# Patient Record
Sex: MALE | Race: WHITE | NOT HISPANIC OR LATINO | Employment: OTHER | URBAN - METROPOLITAN AREA
[De-identification: names, ages, dates, MRNs, and addresses within clinical notes are randomized per-mention and may not be internally consistent; named-entity substitution may affect disease eponyms.]

---

## 2024-04-30 NOTE — PROGRESS NOTES
Chris Dickinson MD  [facility]  [address]  [place] [address]      Dear Doctor Alok,    Thank you for asking me to see your patient, Mr. Uli Macias, in consultation to evaluate his facial trauma.  Today I had the pleasure of seeing him at the Facial Plastic and Reconstructive Surgery Clinic in the Department of Otolaryngology at [facility].    IMPRESSION & RECOMMENDATIONS  1.) Bilateral midfacial fractures including nasal bone, septal, and LeFort II, and left orbital floor  Trauma date: 4/24/24. Mechanism: fall from standing.  Medical Decision Making (MDM): (acute complicated injury) he reports no symptoms of visual changes, nasal obstruction, significant change in appearance, or movement of the fractures. His palate is stable and there is no evidence of entrapment. On CT scan the fractures look minimally displaced. He does not want any operative management if he can avoid it. Will manage non-operatively but will avoid pressure on the fractures with dentures. See checklist below.   Care Checklist:  _No dentures until next visit.  _NO CHEW diet.  _No nose blowing, sneeze with open mouth.  _Ophthalmology consultation    2.) Frontal sinus duct fracture  Trauma date: 4/24/24. Mechanism: fall from standing.  Medical Decision Making (MDM): (acute complicated injury) Again these fractures are relatively non-displaced but we will need to monitor for mucocele formation.   Care Checklist:  _CT scan in 6 months for baseline, then rescan in 3 years to monitor for mucocele formation.    3.) Tobacco use  Medical Decision Making (MDM): (stable chronic problem or illness). The patient currently smokes.  Care Checklist:  _Smoking cessation.     4.) Obstructive Sleep Apnea.  Medical Decision Making (MDM): (stable chronic problem or illness). Diagnosed several years ago. Wears CPAP mask goes over nose and mouth. Usually wears all night. Has not  "used CPAP since his trauma. He reports sleeping well after the trauma.   Care Checklist:  _Observe postoperatively because of sleep apnea, if we consider surgery in the future.  _Hold CPAP until follow up visit if possible. Otherwise, restart if sleep is unsafe or poor quality.      5.) Comorbidities that increase the risk of surgery: Aortic mechanical heart valve on Coumadin and ASA.   Medical Decision Making (MDM): (stable chronic problem or illness).   We discussed the patients comorbidities listed above increase the risks associated with any procedure and recommendations to mitigate those risks are listed in the care checklist below.   Care Checklist:  _Anticoagulation plan for Coumadin and ASA if we consider any surgery in the future.   _From a facial trauma standpoint, can restart coumadin anytime. We discussed how he will still be at risk for epistaxis and will need to call 911 or go to nearest ED if he has any bleeding. Will need to wash his nose frequently with saline.     Background/Connection:   Preferred name = Eleazar  What is most important to know about you as a person? (No medical information) , 3 children, 5 grandchildren.    Photographs: [facility] consents signed May 2, 2024     It has been a pleasure to participate in the care of Mr. Macias. Thank you for this kind referral.       Sincerely,    Samuel Oh MD  Facial Plastic and Reconstructive Surgeon   Department of Otolaryngology  [facility]    ____________________________________________________                HISTORY OF PRESENT ILLNESS and FACIAL TRAUMA QUESTIONAIRRE: Mr. Macias is an 83 year old-year-old male who presents for evaluation of facial trauma.    Trauma Date: 4-25-24    Mechanism:  How did the trauma happen: In patient's words \"Tripped on a threshold into the rocks.\"  Provider- Trauma Mechanism: fall while walking.    LOC:   No: Did you lose consciousness?     Immediate Trauma Care Already Provided:  Yes - " "[facility]: Did you go to an ER or other provider after your trauma?    What ER or provider?  [facility]   What did they do for you?  Transferred to [facility].   Yes -: Did you get any imaging?    What did it show? Orbit and nasal fx    Pain:  Current Pain Score: 0/10.  Location of pain: none.    Appearance:  Yes - Mult brusing: Have the looks or appearance of your face changed since your trauma?   How have your looks changed (In patient's words): \"Brusing.\"    Orbit:  No: Did you have any problems with your eyes prior to trauma?  Unknown: Did your vision change after trauma?   Provider- Did your vision change after trauma? No:  No: Any double vision?  No: Eye pain?    Nose:  Breathing:  No: Before your trauma, did you have nasal congestion or obstruction?  No: Since your trauma, do you have nasal congestion or obstruction?  Provider- Since your trauma, do you have nasal congestion or obstruction? No  N/A: Which side of the nose has the worst breathing?  Provider- Which side of the nose has the worst breathing? NA.  No: Have you broken your nose before?   When did you break your nose in the past? N/A  No: Do you have allergies?   What are your allergy symptoms? N/A  No: Have you ever had nose surgery:  Provider- Have you ever had nose surgery: No   What surgery? N/A    Had rhinorrhea prior to trauma bilaterally. No change in rhinorrhea after the trauma.     Nasal Obstruction Symptom Evaluation (NOSE score) (if the patient reports nasal problems):   Nasal congestion or stuffiness is a fairly bad problem (which he attributes to phlegm and is no worse after the trauma).   Nasal blockage or obstruction is a moderate problem (which he attributes to phlegm and is no worse after the trauma).    Trouble breathing through his nose is not a problem.   Trouble sleeping is not a problem.   Inability to get enough air through his nose during exercise or exertion is not a problem.    Oral " Cavity:  No: Did your bite change from the trauma?  Provider- Did your bite change from the trauma? No Has dentures and has not worn dentures after the trauma.  No: Any trouble opening your mouth after the trauma?  No: Jaw pain?      Skin:  Yes - on nose: Facial abrasions?   Yes - on nose: Facial cuts?   Has the cut been repaired? N/A    Sensory and Neuro:  No: Facial weakness?   No: Facial numbness:   No: Voice change?   No: Hearing problems after your trauma?     Risk Factors:  No: History of seizure:  No: History of cognitive impairment/developmental delay?  No: History of radiation in the head/neck?  No: History of surgery in the head/neck?    Domestic Violence questions:   No: Has anyone hit or physically hurt you in the last year?      REVIEW OF SYSTEMS: a 12 system review was performed by the patient care staff:    Do you currently have or have you ever had in the past:    No: Complications with sedation or anesthesia.  Yes - Coumadin: Use blood thinners. Yes:  Coumadin.   Yes - Aortic valve: Any heart problems.   No: Chest pain.   Yes - : A pacemaker.  No: Problems with excessive bleeding or a bleeding disorder.  No: Problems with blood clots or a clotting disorder.   Yes - Cpap: Sleep apnea or sleep with a CPAP machine.       No: Excessive scarring.   No: Night sweats.   No: Fevers.   No: Double vision.   Yes: Vision loss.   No: Snoring.   No: Difficulty breathing through your nose.   Yes - PND: Runny nose.   No: Sneezing.   No: Itchy eyes.   No: Itchy throat.   No: Face pain.   No: Face weakness.   No: Face numbness.   No: Difficulty swallowing.   No: Pain with swallowing.,   No: Difficulty hearing or hearing loss.   Yes - Has stent /urine bag: Difficulty urinating.   No: Anxiety.   No: Depression.     FAMILY HISTORY:  No: Family history of excessive bleeding or a bleeding disorder.    No: Family history of blood clots or a clotting disorder.    No: Family history of skin cancer.    No family history on  file.    PAST MEDICAL HISTORY: No past medical history on file.    PAST SURGICAL HISTORY: No past surgical history on file.    SOCIAL HISTORY:   Social History     Tobacco Use    Smoking status: Not on file    Smokeless tobacco: Not on file   Substance Use Topics    Alcohol use: Not on file       ALLERGIES: Patient has no allergy information on record.    MEDICATIONS:   No current outpatient medications on file.           Patient Care Staff Signature: Genoveva Givens RMA    ______________________________________________    Provider- The information above was gathered by our nursing staff as part of a nurse questionnaire from patient reported data and has been verified by me. Review of Systems, Family History, Past Medical History, Social History, Allergies, and Medications taken by the patient care staff was reviewed by me: Samuel Oh MD       PHYSICAL EXAMINATION:  CONSTITUTIONAL:  No apparent distress.  Pleasant affect.  Normal ability to communicate.  CRANIOFACIAL:  Normocephalic. Malar eminences roughly symmetric.  SKIN:  Lacerations: superior dorsum is closed. Ecchymoses extensive throughout his fce.   EYES:     Extraocular muscles: intact bilaterally.    Pupils equally round and reactive to light.    Palpable periorbital step-offs: absent.   Enophthalmos: absent on bird's eye view.  EARS:  Normal auricles.  Tympanic membranes clear bilaterally. No hemotympanum.  NOSE:    Dorsum: deviate to the left, nonmobile.   Septum: no hematoma or perforation. Septal deviation to the right inferiorly contacting the lateral nasal sidewall.   ORAL CAVITY AND OROPHARYNX:    Occlusion: no malocclusion.    Floor of mouth: no hematoma.    Dentition: edentulous. Stable palate.    Mouth opening: no trismus.  NECK:  The parotid is soft, without masses.  Supple laryngeal landmarks.  NEUROLOGIC:     Facial nerve: intact bilaterally.    V1-V3: intact.  RESPIRATORY:  Normal respiratory effort.  No stridor.  Voice  strong.      CT facial bones from 4/25/24. I also personally reviewed the images.   Impression      1. Extensive fractures through the nasal bone and frontal/maxillary sinuses as above, including mildly depressed fractures of the anterior maxillary sinus bilaterally. On the left, this extends to the orbital floor (blowout fracture), with mild  involvement of the left infraorbital foramen. Clinical correlation for any evidence of nerve involvement is therefore necessary.  Narrative    EXAM: CT MAXILLOFACIAL WITHOUT IV CONTRAST    COMPARISON: Head CT performed at the same time.    FINDINGS: There is a fracture involving the base of the frontal sinuses/superior nasal bone, mildly displaced, with an air-fluid level within both frontal sinuses consistent with hemorrhage. There is a mildly depressed/comminuted fracture involving the  anterior wall of both maxillary sinuses, extending into the lateral wall bilaterally, greater on the left. Hemorrhage is identified within both maxillary sinuses, again, greater on the left. There is significant subcutaneous emphysema along the left  infraorbital soft tissues and adjacent to the lateral aspect of the left maxillary sinus. Mild emphysema extends into the inferior left orbit consistent with a blowout fracture. The fracture extends into the floor of the left orbit, with mild involvement   of the infraorbital foramen, and clinical correlation is therefore necessary. There is a mildly displaced associated fracture of the lateral pterygoid wing bilaterally. Both globes are intact. There are mildly displaced fractures involving the nasal  bone including the left and right columns.        Total time spent on this encounter, on the date of service, including pre-visit review of separately obtained history, face-to-face interaction performing medically appropriate physical exam, patient counseling/education, interpretation of diagnostic results, care coordination and documentation was  65 minutes.    Samuel Oh MD

## 2024-05-02 ENCOUNTER — OFFICE VISIT (OUTPATIENT)
Dept: OTOLARYNGOLOGY | Facility: CLINIC | Age: 84
End: 2024-05-02
Payer: COMMERCIAL

## 2024-05-02 DIAGNOSIS — S09.93XA FACIAL INJURY, INITIAL ENCOUNTER: Primary | ICD-10-CM

## 2024-05-02 PROCEDURE — 99205 OFFICE O/P NEW HI 60 MIN: CPT | Performed by: OTOLARYNGOLOGY

## 2024-05-02 RX ORDER — WARFARIN SODIUM 2.5 MG/1
TABLET ORAL
COMMUNITY
Start: 2024-04-30

## 2024-05-02 RX ORDER — TAMSULOSIN HYDROCHLORIDE 0.4 MG/1
0.4 CAPSULE ORAL
COMMUNITY
Start: 2023-07-18 | End: 2024-06-01

## 2024-05-02 RX ORDER — METOPROLOL TARTRATE 50 MG
25 TABLET ORAL
COMMUNITY
Start: 2023-07-18

## 2024-05-02 RX ORDER — ATORVASTATIN CALCIUM 80 MG/1
80 TABLET, FILM COATED ORAL
COMMUNITY
Start: 2023-07-18

## 2024-05-02 RX ORDER — ACETAMINOPHEN 500 MG
500 TABLET ORAL
COMMUNITY
Start: 2022-11-01

## 2024-05-02 RX ORDER — ASPIRIN 81 MG/1
1 TABLET ORAL DAILY
COMMUNITY
Start: 2023-07-18

## 2024-05-02 RX ORDER — SENNOSIDES A AND B 8.6 MG/1
8.6 TABLET, FILM COATED ORAL
COMMUNITY

## 2024-05-02 RX ORDER — CALCIUM CARBONATE/VITAMIN D3 600 MG-10
1 TABLET ORAL
COMMUNITY
Start: 2022-11-17

## 2024-05-02 NOTE — LETTER
5/2/2024         RE: Uli Macias  [address] [place] [address]        Dear Colleague,    Thank you for referring your patient, Uli Macias, to the [facility]. Please see a copy of my visit note below.    Chris Dickinson MD  [facility]  [address]  [place] [address]      Dear Doctor Alok,    Thank you for asking me to see your patient, Mr. Uli Macias, in consultation to evaluate his facial trauma.  Today I had the pleasure of seeing him at the Facial Plastic and Reconstructive Surgery Clinic in the Department of Otolaryngology at [facility].    IMPRESSION & RECOMMENDATIONS  1.) Bilateral midfacial fractures including nasal bone, septal, and LeFort II, and left orbital floor  Trauma date: 4/24/24. Mechanism: fall from standing.  Medical Decision Making (MDM): (acute complicated injury) he reports no symptoms of visual changes, nasal obstruction, significant change in appearance, or movement of the fractures. His palate is stable and there is no evidence of entrapment. On CT scan the fractures look minimally displaced. He does not want any operative management if he can avoid it. Will manage non-operatively but will avoid pressure on the fractures with dentures. See checklist below.   Care Checklist:  _No dentures until next visit.  _NO CHEW diet.  _No nose blowing, sneeze with open mouth.  _Ophthalmology consultation    2.) Frontal sinus duct fracture  Trauma date: 4/24/24. Mechanism: fall from standing.  Medical Decision Making (MDM): (acute complicated injury) Again these fractures are relatively non-displaced but we will need to monitor for mucocele formation.   Care Checklist:  _CT scan in 6 months for baseline, then rescan in 3 years to monitor for mucocele formation.    3.) Tobacco use  Medical Decision Making (MDM): (stable chronic problem or illness). The patient  currently smokes.  Care Checklist:  _Smoking cessation.     4.) Obstructive Sleep Apnea.  Medical Decision Making (MDM): (stable chronic problem or illness). Diagnosed several years ago. Wears CPAP mask goes over nose and mouth. Usually wears all night. Has not used CPAP since his trauma. He reports sleeping well after the trauma.   Care Checklist:  _Observe postoperatively because of sleep apnea, if we consider surgery in the future.  _Hold CPAP until follow up visit if possible. Otherwise, restart if sleep is unsafe or poor quality.      5.) Comorbidities that increase the risk of surgery: Aortic mechanical heart valve on Coumadin and ASA.   Medical Decision Making (MDM): (stable chronic problem or illness).   We discussed the patients comorbidities listed above increase the risks associated with any procedure and recommendations to mitigate those risks are listed in the care checklist below.   Care Checklist:  _Anticoagulation plan for Coumadin and ASA if we consider any surgery in the future.   _From a facial trauma standpoint, can restart coumadin anytime. We discussed how he will still be at risk for epistaxis and will need to call 911 or go to nearest ED if he has any bleeding. Will need to wash his nose frequently with saline.     Background/Connection:   Preferred name = Eleazar  What is most important to know about you as a person? (No medical information) , 3 children, 5 grandchildren.    Photographs: [facility] consents signed May 2, 2024     It has been a pleasure to participate in the care of Mr. Macias. Thank you for this kind referral.       Sincerely,    Samuel Oh MD  Facial Plastic and Reconstructive Surgeon   Department of Otolaryngology  [facility]    ____________________________________________________                HISTORY OF PRESENT ILLNESS and FACIAL TRAUMA QUESTIONAIRRE: Mr. Macias is an 83 year old-year-old male who presents for evaluation of facial trauma.    Trauma  "Date: 4-25-24    Mechanism:  How did the trauma happen: In patient's words \"Tripped on a threshold into the rocks.\"  Provider- Trauma Mechanism: fall while walking.    LOC:   No: Did you lose consciousness?     Immediate Trauma Care Already Provided:  Yes - [facility]: Did you go to an ER or other provider after your trauma?    What ER or provider?  [facility]   What did they do for you?  Transferred to [facility].   Yes -: Did you get any imaging?    What did it show? Orbit and nasal fx    Pain:  Current Pain Score: 0/10.  Location of pain: none.    Appearance:  Yes - Mult brusing: Have the looks or appearance of your face changed since your trauma?   How have your looks changed (In patient's words): \"Brusing.\"    Orbit:  No: Did you have any problems with your eyes prior to trauma?  Unknown: Did your vision change after trauma?   Provider- Did your vision change after trauma? No:  No: Any double vision?  No: Eye pain?    Nose:  Breathing:  No: Before your trauma, did you have nasal congestion or obstruction?  No: Since your trauma, do you have nasal congestion or obstruction?  Provider- Since your trauma, do you have nasal congestion or obstruction? No  N/A: Which side of the nose has the worst breathing?  Provider- Which side of the nose has the worst breathing? NA.  No: Have you broken your nose before?   When did you break your nose in the past? N/A  No: Do you have allergies?   What are your allergy symptoms? N/A  No: Have you ever had nose surgery:  Provider- Have you ever had nose surgery: No   What surgery? N/A    Had rhinorrhea prior to trauma bilaterally. No change in rhinorrhea after the trauma.     Nasal Obstruction Symptom Evaluation (NOSE score) (if the patient reports nasal problems):   Nasal congestion or stuffiness is a fairly bad problem (which he attributes to phlegm and is no worse after the trauma).   Nasal blockage or obstruction is a moderate problem (which he " attributes to phlegm and is no worse after the trauma).    Trouble breathing through his nose is not a problem.   Trouble sleeping is not a problem.   Inability to get enough air through his nose during exercise or exertion is not a problem.    Oral Cavity:  No: Did your bite change from the trauma?  Provider- Did your bite change from the trauma? No Has dentures and has not worn dentures after the trauma.  No: Any trouble opening your mouth after the trauma?  No: Jaw pain?      Skin:  Yes - on nose: Facial abrasions?   Yes - on nose: Facial cuts?   Has the cut been repaired? N/A    Sensory and Neuro:  No: Facial weakness?   No: Facial numbness:   No: Voice change?   No: Hearing problems after your trauma?     Risk Factors:  No: History of seizure:  No: History of cognitive impairment/developmental delay?  No: History of radiation in the head/neck?  No: History of surgery in the head/neck?    Domestic Violence questions:   No: Has anyone hit or physically hurt you in the last year?      REVIEW OF SYSTEMS: a 12 system review was performed by the patient care staff:    Do you currently have or have you ever had in the past:    No: Complications with sedation or anesthesia.  Yes - Coumadin: Use blood thinners. Yes:  Coumadin.   Yes - Aortic valve: Any heart problems.   No: Chest pain.   Yes - : A pacemaker.  No: Problems with excessive bleeding or a bleeding disorder.  No: Problems with blood clots or a clotting disorder.   Yes - Cpap: Sleep apnea or sleep with a CPAP machine.       No: Excessive scarring.   No: Night sweats.   No: Fevers.   No: Double vision.   Yes: Vision loss.   No: Snoring.   No: Difficulty breathing through your nose.   Yes - PND: Runny nose.   No: Sneezing.   No: Itchy eyes.   No: Itchy throat.   No: Face pain.   No: Face weakness.   No: Face numbness.   No: Difficulty swallowing.   No: Pain with swallowing.,   No: Difficulty hearing or hearing loss.   Yes - Has stent /urine bag: Difficulty  urinating.   No: Anxiety.   No: Depression.     FAMILY HISTORY:  No: Family history of excessive bleeding or a bleeding disorder.    No: Family history of blood clots or a clotting disorder.    No: Family history of skin cancer.    No family history on file.    PAST MEDICAL HISTORY: No past medical history on file.    PAST SURGICAL HISTORY: No past surgical history on file.    SOCIAL HISTORY:   Social History     Tobacco Use     Smoking status: Not on file     Smokeless tobacco: Not on file   Substance Use Topics     Alcohol use: Not on file       ALLERGIES: Patient has no allergy information on record.    MEDICATIONS:   No current outpatient medications on file.           Patient Care Staff Signature: Genoveva Givens A    ______________________________________________    Provider- The information above was gathered by our nursing staff as part of a nurse questionnaire from patient reported data and has been verified by me. Review of Systems, Family History, Past Medical History, Social History, Allergies, and Medications taken by the patient care staff was reviewed by me: Samuel Oh MD       PHYSICAL EXAMINATION:  CONSTITUTIONAL:  No apparent distress.  Pleasant affect.  Normal ability to communicate.  CRANIOFACIAL:  Normocephalic. Malar eminences roughly symmetric.  SKIN:  Lacerations: superior dorsum is closed. Ecchymoses extensive throughout his fce.   EYES:     Extraocular muscles: intact bilaterally.    Pupils equally round and reactive to light.    Palpable periorbital step-offs: absent.   Enophthalmos: absent on bird's eye view.  EARS:  Normal auricles.  Tympanic membranes clear bilaterally. No hemotympanum.  NOSE:    Dorsum: deviate to the left, nonmobile.   Septum: no hematoma or perforation. Septal deviation to the right inferiorly contacting the lateral nasal sidewall.   ORAL CAVITY AND OROPHARYNX:    Occlusion: no malocclusion.    Floor of mouth: no hematoma.    Dentition: edentulous. Stable  palate.    Mouth opening: no trismus.  NECK:  The parotid is soft, without masses.  Supple laryngeal landmarks.  NEUROLOGIC:     Facial nerve: intact bilaterally.    V1-V3: intact.  RESPIRATORY:  Normal respiratory effort.  No stridor.  Voice strong.      CT facial bones from 4/25/24. I also personally reviewed the images.   Impression      1. Extensive fractures through the nasal bone and frontal/maxillary sinuses as above, including mildly depressed fractures of the anterior maxillary sinus bilaterally. On the left, this extends to the orbital floor (blowout fracture), with mild  involvement of the left infraorbital foramen. Clinical correlation for any evidence of nerve involvement is therefore necessary.  Narrative    EXAM: CT MAXILLOFACIAL WITHOUT IV CONTRAST    COMPARISON: Head CT performed at the same time.    FINDINGS: There is a fracture involving the base of the frontal sinuses/superior nasal bone, mildly displaced, with an air-fluid level within both frontal sinuses consistent with hemorrhage. There is a mildly depressed/comminuted fracture involving the  anterior wall of both maxillary sinuses, extending into the lateral wall bilaterally, greater on the left. Hemorrhage is identified within both maxillary sinuses, again, greater on the left. There is significant subcutaneous emphysema along the left  infraorbital soft tissues and adjacent to the lateral aspect of the left maxillary sinus. Mild emphysema extends into the inferior left orbit consistent with a blowout fracture. The fracture extends into the floor of the left orbit, with mild involvement   of the infraorbital foramen, and clinical correlation is therefore necessary. There is a mildly displaced associated fracture of the lateral pterygoid wing bilaterally. Both globes are intact. There are mildly displaced fractures involving the nasal  bone including the left and right columns.        Total time spent on this encounter, on the date of  service, including pre-visit review of separately obtained history, face-to-face interaction performing medically appropriate physical exam, patient counseling/education, interpretation of diagnostic results, care coordination and documentation was 65 minutes.    Samuel Oh MD       Again, thank you for allowing me to participate in the care of your patient.        Sincerely,        Samuel Oh MD

## 2024-05-02 NOTE — NURSING NOTE
Uli Macias's chief complaint for this visit includes:  Chief Complaint   Patient presents with    Consult     Orbit and nasal fx. DOI 4-25-24      PCP: Schoeneman, Michael    Referring Provider:  No referring provider defined for this encounter.    There were no vitals taken for this visit.

## 2024-05-02 NOTE — Clinical Note
Please send letter in progress note section  to referring physician and PCP with appropriate letterhead.  Chris Dickinson MD [facility] [address] [place] [address]

## 2024-05-08 ENCOUNTER — TELEPHONE (OUTPATIENT)
Dept: OTOLARYNGOLOGY | Facility: CLINIC | Age: 84
End: 2024-05-08
Payer: COMMERCIAL

## 2024-05-08 NOTE — TELEPHONE ENCOUNTER
Phone call to Shae who states pt has lost considerable weight on the no chew diet. Discussed no earlier appointments are available, as Dr Oh is not in clinic, however, if pt is aware of the risks of denture use and wants to try, he can start a soft diet.  Recommended Shae talk with the facility dietician about maximizing calories/nutrition while on restricted diet.  Shae verbalized understanding of plan.  Erlinda Padilla RN

## 2024-05-08 NOTE — TELEPHONE ENCOUNTER
[facility]    Phone Message    May a detailed message be left on voicemail: yes     Reason for Call: Other: Pt daughter called with some concerns. Pt has a follow up appt on 05/24/24 and he was told don't wear his dentures until then however she stated he is losing weight and she wants to see if he can be seen sooner so he can start wearing his dentures. Please call to discuss. Thanks      Action Taken: Message routed to:  Other: [facility]    Travel Screening: Not Applicable

## 2024-05-16 NOTE — PROGRESS NOTES
IMPRESSION & RECOMMENDATIONS  1.) Bilateral midfacial fractures including nasal bone, septal, and LeFort II, and left orbital floor  Trauma date: 4/24/24. Mechanism: fall from standing.  Medical Decision Making (MDM): (acute complicated injury) Mr. Macias continues to report no symptoms of visual changes, nasal obstruction, significant change in appearance, or movement of the fractures. His palate is stable and there is no evidence of entrapment. He is healing appropriately and we will continue to manage non-operatively. See checklist below.   Care Checklist:  _No dentures until next visit. Update 5/24/24: he started wearing dentures about 2 weeks ago without problems.   _Continue soft diet for two more weeks then advance diet as tolerate.   _Ophthalmology consultation. Update 5/24/24: per family, he saw an ophthalmologist who had no concerns about his vision and no problems related to the trauma.   _Can stop saline use in the nose.      2.) Frontal sinus duct fracture  Trauma date: 4/24/24. Mechanism: fall from standing.  Medical Decision Making (MDM): (acute complicated injury) Again these fractures are relatively non-displaced but we will need to monitor for mucocele formation.   Care Checklist:  _CT scan in 6 months for baseline, then rescan in 3 years to monitor for mucocele formation.  _RTC after CT scan or sooner if needed.      3.) Tobacco use  Medical Decision Making (MDM): (stable chronic problem or illness). The patient currently smokes.  Care Checklist:  _Smoking cessation. Update 5/24/24: he continues to smoke.      4.) Obstructive Sleep Apnea.  Medical Decision Making (MDM): (stable chronic problem or illness). Diagnosed several years ago. Wears CPAP mask goes over nose and mouth. Usually wears all night. Has not used CPAP since his trauma. He reports sleeping well after the trauma.   Care Checklist:  _Observe postoperatively because of sleep apnea, if we consider surgery in the future.  _Hold CPAP  until follow up visit if possible. Update 5/24/24: restart CPAP.       5.) Comorbidities that increase the risk of surgery: Aortic mechanical heart valve on Coumadin and ASA.   Medical Decision Making (MDM): (stable chronic problem or illness).   We discussed the patients comorbidities listed above increase the risks associated with any procedure and recommendations to mitigate those risks are listed in the care checklist below.   Care Checklist:  _Anticoagulation plan for Coumadin and ASA if we consider any surgery in the future.   _From a facial trauma standpoint, can restart coumadin anytime. We discussed how he will still be at risk for epistaxis and will need to call 911 or go to nearest ED if he has any bleeding. Will need to wash his nose frequently with saline.      Background/Connection:   Preferred name = Eleazar  What is most important to know about you as a person? (No medical information) , 3 children, 5 grandchildren.     Photographs: [facility] consents signed May 2, 2024.    Samuel Oh MD       Mr. Macias reports he is healing well. He has not symptoms related to the trauma. But his food intake and soft diet has been frustrating. He reports no change in denture fit. No nasal obstruction. He does have exercise limitation when walking the halls.     On exam: EOMi. No periorbital step offs. Dentures were removed and there was no palatal instability. Left dorsal deviation with septal deviation but no septal hematoma or perforation.     Samuel Oh MD

## 2024-05-24 ENCOUNTER — OFFICE VISIT (OUTPATIENT)
Dept: OTOLARYNGOLOGY | Facility: CLINIC | Age: 84
End: 2024-05-24
Payer: COMMERCIAL

## 2024-05-24 DIAGNOSIS — S09.93XA FACIAL INJURY, INITIAL ENCOUNTER: Primary | ICD-10-CM

## 2024-05-24 PROCEDURE — 99212 OFFICE O/P EST SF 10 MIN: CPT | Performed by: OTOLARYNGOLOGY

## 2024-05-24 NOTE — LETTER
5/24/2024         RE: Uli Macias  [address] [place] [address]        Dear Colleague,    Thank you for referring your patient, Uli Macias, to the [facility]. Please see a copy of my visit note below.    IMPRESSION & RECOMMENDATIONS  1.) Bilateral midfacial fractures including nasal bone, septal, and LeFort II, and left orbital floor  Trauma date: 4/24/24. Mechanism: fall from standing.  Medical Decision Making (MDM): (acute complicated injury) Mr. Macias continues to report no symptoms of visual changes, nasal obstruction, significant change in appearance, or movement of the fractures. His palate is stable and there is no evidence of entrapment. He is healing appropriately and we will continue to manage non-operatively. See checklist below.   Care Checklist:  _No dentures until next visit. Update 5/24/24: he started wearing dentures about 2 weeks ago without problems.   _Continue soft diet for two more weeks then advance diet as tolerate.   _Ophthalmology consultation. Update 5/24/24: per family, he saw an ophthalmologist who had no concerns about his vision and no problems related to the trauma.   _Can stop saline use in the nose.      2.) Frontal sinus duct fracture  Trauma date: 4/24/24. Mechanism: fall from standing.  Medical Decision Making (MDM): (acute complicated injury) Again these fractures are relatively non-displaced but we will need to monitor for mucocele formation.   Care Checklist:  _CT scan in 6 months for baseline, then rescan in 3 years to monitor for mucocele formation.  _RTC after CT scan or sooner if needed.      3.) Tobacco use  Medical Decision Making (MDM): (stable chronic problem or illness). The patient currently smokes.  Care Checklist:  _Smoking cessation. Update 5/24/24: he continues to smoke.      4.) Obstructive Sleep Apnea.  Medical Decision Making (MDM): (stable chronic problem or illness). Diagnosed several years ago.  Wears CPAP mask goes over nose and mouth. Usually wears all night. Has not used CPAP since his trauma. He reports sleeping well after the trauma.   Care Checklist:  _Observe postoperatively because of sleep apnea, if we consider surgery in the future.  _Hold CPAP until follow up visit if possible. Update 5/24/24: restart CPAP.       5.) Comorbidities that increase the risk of surgery: Aortic mechanical heart valve on Coumadin and ASA.   Medical Decision Making (MDM): (stable chronic problem or illness).   We discussed the patients comorbidities listed above increase the risks associated with any procedure and recommendations to mitigate those risks are listed in the care checklist below.   Care Checklist:  _Anticoagulation plan for Coumadin and ASA if we consider any surgery in the future.   _From a facial trauma standpoint, can restart coumadin anytime. We discussed how he will still be at risk for epistaxis and will need to call 911 or go to nearest ED if he has any bleeding. Will need to wash his nose frequently with saline.      Background/Connection:   Preferred name = Eleazar  What is most important to know about you as a person? (No medical information) , 3 children, 5 grandchildren.     Photographs: [facility] consents signed May 2, 2024.    Samuel Oh MD       Mr. Macias reports he is healing well. He has not symptoms related to the trauma. But his food intake and soft diet has been frustrating. He reports no change in denture fit. No nasal obstruction. He does have exercise limitation when walking the halls.     On exam: EOMi. No periorbital step offs. Dentures were removed and there was no palatal instability. Left dorsal deviation with septal deviation but no septal hematoma or perforation.     Samuel Oh MD          Again, thank you for allowing me to participate in the care of your patient.        Sincerely,        Samuel Oh MD

## 2024-05-24 NOTE — NURSING NOTE
Uli Macias's chief complaint for this visit includes:  Chief Complaint   Patient presents with    Follow Up     Recheck facial trauma     PCP: Schoeneman, Michael    Referring Provider:  No referring provider defined for this encounter.    There were no vitals taken for this visit.